# Patient Record
Sex: MALE | Race: WHITE
[De-identification: names, ages, dates, MRNs, and addresses within clinical notes are randomized per-mention and may not be internally consistent; named-entity substitution may affect disease eponyms.]

---

## 2018-03-22 ENCOUNTER — HOSPITAL ENCOUNTER (OUTPATIENT)
Dept: HOSPITAL 11 - JP.SDS | Age: 73
Discharge: HOME | End: 2018-03-22
Attending: OPHTHALMOLOGY
Payer: MEDICARE

## 2018-03-22 DIAGNOSIS — Z91.040: ICD-10-CM

## 2018-03-22 DIAGNOSIS — Z91.048: ICD-10-CM

## 2018-03-22 DIAGNOSIS — H26.9: Primary | ICD-10-CM

## 2018-03-22 PROCEDURE — 66984 XCAPSL CTRC RMVL W/O ECP: CPT

## 2018-03-22 PROCEDURE — C1780 LENS, INTRAOCULAR (NEW TECH): HCPCS

## 2018-03-22 NOTE — OR
DATE OF PROCEDURE:  03/22/2018

 

POSTOPERATIVE CARE:  Postoperative care will be provided mainly at the [address] [place] [address] Eye

[facility] in conjunction with [facility].

 

PREOPERATIVE DIAGNOSIS:  Cataract, right eye.

 

PREOPERATIVE DIAGNOSIS:  Cataract, right eye.

 

PROCEDURE:  He underwent cataract extraction, phacoemulsification, and intraocular lens

placement in the right eye.

 

ANESTHESIA:  Topical and intracameral.

 

ESTIMATED BLOOD LOSS:  Minimal.

 

COMPLICATIONS:  None.

 

PATHOLOGY SPECIMENS:  None.

 

SURGICAL FINDINGS:  None.

 

INDICATION FOR PROCEDURE:  The patient is a 72-year-old male with history of a visually

significant cataract in the right eye, which interfered with activities of daily living.

This consisted of a nuclear sclerosis cataract.  Following careful discussion of the risks,

benefits and alternatives to cataract extraction with intraocular lens placement including

blindness and death, the patient elected to proceed, and informed, written consent was

obtained prior to the procedure.

 

DESCRIPTION OF THE PROCEDURE:  The patient was previously identified, and a faizan placed

above the right eye.  All sources, including the patient, indicated that the right eye was

the correct eye.  The patient was subsequently taken to the operating room where standard

monitors were applied.  The patient was then prepped and draped in the usual sterile fashion

for ophthalmic surgery.  Attention was first directed at the 12 o'clock position where a

paracentesis port was fashioned.  Shugar solution followed by Viscoat was instilled into the

eye.  Attention was then directed to the 8:30 position where a triplanar incision was made

in a near-clear manner using a keratome.  A continuous capsulorrhexis was then made using a

combination of the cystotome and Utrata forceps.  Hydrodissection was achieved using a

balanced salt solution, and the lens rotated nicely.  Phacoemulsification was then done

using a modified divide-and-conquer technique without complication.  Phaco time was 11.93

CDE. The remaining cortex was removed using the irrigation/aspiration handpiece.  We did

place a single 10-0 nylon suture in the main wound, and all wounds were checked and found to

be watertight.  Provisc was then instilled into the eye.  A Technis lens, model [id number], at

20.5 Diopter lens was then placed in the capsular bag using an Emerald injector.  The

remaining viscoelastic was removed using the irrigation/aspiration forceps.  All wounds were

then checked and found to be watertight.  The lid speculum and drapes were removed.

Maxitrol ointment was placed in the patient's right eye, and the eye was shielded.  The

patient tolerated the procedure well.  The patient was instructed to follow up tomorrow.

 

All needle and sponge counts were correct at the end of the procedure.

 

 

 

 

Susana Brand MD

DD:  03/22/2018 07:50:16

DT:  03/22/2018 08:40:57

Job #:  [id number]

## 2018-04-12 ENCOUNTER — HOSPITAL ENCOUNTER (OUTPATIENT)
Dept: HOSPITAL 11 - JP.SDS | Age: 73
Discharge: HOME | End: 2018-04-12
Attending: OPHTHALMOLOGY
Payer: MEDICARE

## 2018-04-12 DIAGNOSIS — H25.12: Primary | ICD-10-CM

## 2018-04-12 PROCEDURE — 66984 XCAPSL CTRC RMVL W/O ECP: CPT

## 2018-04-12 PROCEDURE — C1780 LENS, INTRAOCULAR (NEW TECH): HCPCS

## 2018-04-12 NOTE — OR
DATE OF PROCEDURE:  04/12/2018

 

POSTOPERATIVE CARE:  Postoperative care will be provided mainly at the [address] [place] [address] Eye

[facility] in conjunction with [facility].

 

PREOPERATIVE DIAGNOSIS:  Cataract, left eye.

 

PREOPERATIVE DIAGNOSIS:  Cataract, left eye.

 

PROCEDURE:  Cataract extraction, phacoemulsification with intraocular lens placement, left

eye.

 

ANESTHESIA:  Topical and intracameral.

 

ESTIMATED BLOOD LOSS:  Minimal.

 

COMPLICATIONS:  None.

 

PATHOLOGY SPECIMENS:  None.

 

SURGICAL FINDINGS:  None.

 

INDICATION FOR PROCEDURE:  The patient is a 72-year-old male with history of a visually

significant cataract in the left eye, which interfered with activities of daily living.

This consisted of a nuclear sclerosis cataract.  Following careful discussion of the risks,

benefits and alternatives to cataract extraction with intraocular lens placement including

blindness and death, the patient elected to proceed, and informed, written consent was

obtained prior to the procedure.

 

DESCRIPTION OF THE PROCEDURE:  The patient was previously identified, and a faizan placed

above the left eye.  All sources, including the patient, indicated that the left eye was the

correct eye.  The patient was subsequently taken to the operating room where standard

monitors were applied.  The patient was then prepped and draped in the usual sterile fashion

for ophthalmic surgery.  Attention was first directed at the 12 o'clock position where a

paracentesis port was fashioned.  Shugar solution followed by Viscoat was instilled into the

eye.  Attention was then directed to the 8:30 position where a triplanar incision was made

in a near-clear manner using a keratome.  A continuous capsulorrhexis was then made using a

combination of the cystotome and Utrata forceps.  Hydrodissection was achieved using a

balanced salt solution, and the lens rotated nicely.  Phacoemulsification was then done

using a modified divide-and-conquer technique without complication.  Phaco time was 10.73

CDE. The remaining cortex was removed using the irrigation/aspiration handpiece.  Provisc

was then instilled into the eye.  A Technis lens, model [id number], at 20.5 diopters was then

placed in the capsular bag using an Emerald injector.  The remaining viscoelastic was

removed using the irrigation/aspiration forceps.  A single 10-0 nylon stitch was placed in

the main wound.  All wounds were then checked and found to be

watertight.  The lid speculum and drapes were removed.  Maxitrol ointment was placed in the

patient's left eye, and the eye was shielded.  The patient tolerated the procedure well.

The patient was instructed to follow up tomorrow.

 

All needle and sponge counts were correct at the end of the procedure.

 

 

 

 

Susana Brand MD

DD:  04/12/2018 08:54:11

DT:  04/12/2018 10:01:26

Job #:  [id number]

## 2024-10-04 ENCOUNTER — HOSPITAL ENCOUNTER (EMERGENCY)
Dept: HOSPITAL 11 - JP.ED | Age: 79
Discharge: HOME | End: 2024-10-04
Payer: MEDICARE

## 2024-10-04 DIAGNOSIS — Z79.899: ICD-10-CM

## 2024-10-04 DIAGNOSIS — I10: ICD-10-CM

## 2024-10-04 DIAGNOSIS — K21.9: ICD-10-CM

## 2024-10-04 DIAGNOSIS — Z91.048: ICD-10-CM

## 2024-10-04 DIAGNOSIS — K08.89: Primary | ICD-10-CM

## 2024-10-04 DIAGNOSIS — Z79.01: ICD-10-CM
